# Patient Record
Sex: MALE | URBAN - METROPOLITAN AREA
[De-identification: names, ages, dates, MRNs, and addresses within clinical notes are randomized per-mention and may not be internally consistent; named-entity substitution may affect disease eponyms.]

---

## 2020-12-23 ENCOUNTER — APPOINTMENT (OUTPATIENT)
Age: 50
Setting detail: DERMATOLOGY
End: 2020-12-23

## 2020-12-23 DIAGNOSIS — R20.8 OTHER DISTURBANCES OF SKIN SENSATION: ICD-10-CM

## 2020-12-23 DIAGNOSIS — L82.0 INFLAMED SEBORRHEIC KERATOSIS: ICD-10-CM

## 2020-12-23 PROBLEM — D48.5 NEOPLASM OF UNCERTAIN BEHAVIOR OF SKIN: Status: ACTIVE | Noted: 2020-12-23

## 2020-12-23 PROCEDURE — 99202 OFFICE O/P NEW SF 15 MIN: CPT | Mod: 95

## 2020-12-23 PROCEDURE — OTHER ADDITIONAL NOTES: OTHER

## 2020-12-23 ASSESSMENT — LOCATION DETAILED DESCRIPTION DERM
LOCATION DETAILED: LEFT INFERIOR LATERAL FOREHEAD
LOCATION DETAILED: PERIUMBILICAL SKIN

## 2020-12-23 ASSESSMENT — LOCATION SIMPLE DESCRIPTION DERM
LOCATION SIMPLE: ABDOMEN
LOCATION SIMPLE: LEFT FOREHEAD

## 2020-12-23 ASSESSMENT — LOCATION ZONE DERM
LOCATION ZONE: FACE
LOCATION ZONE: TRUNK

## 2020-12-23 NOTE — HPI: RASH
How Severe Is Your Rash?: mild
Is This A New Presentation, Or A Follow-Up?: Rash
Additional History: Shirt doesn’t hit bit burns with pressure like fire. This is a 3x3 inch area. As soon the pressing stops the burningqq pain stops. Denies any lumps.

## 2020-12-23 NOTE — PROCEDURE: ADDITIONAL NOTES
Additional Notes: Discussed that since there is no visible skin changes and given the limitations of the virtual visit, it is difficult to say with certainty what the more specific diagnosis is. Discussed the possibility of a tender deep lipoma. Recommended return to clinic in 1 month if not resolved for further eval. Would consider intralesional Ken[employer]og. Patient expressed understanding.
Detail Level: Detailed
Additional Notes: Discussed that a certain diagnosis cannot be given at this time. Advised that this could be inflamed, and a skin cancer cannot be ruled out at this time without an in person evaluation. Recommended returning to clinic as soon as he is able to for eval. Patient expressed understanding.
Detail Level: Simple

## 2020-12-23 NOTE — HPI: SKIN LESION
How Severe Is Your Skin Lesion?: moderate
Has Your Skin Lesion Been Treated?: not been treated
Is This A New Presentation, Or A Follow-Up?: Skin Lesion
Additional History: 3-4 days ago was picked at.